# Patient Record
Sex: MALE | Race: WHITE | NOT HISPANIC OR LATINO
[De-identification: names, ages, dates, MRNs, and addresses within clinical notes are randomized per-mention and may not be internally consistent; named-entity substitution may affect disease eponyms.]

---

## 2020-07-06 ENCOUNTER — NON-APPOINTMENT (OUTPATIENT)
Age: 50
End: 2020-07-06

## 2020-07-06 ENCOUNTER — APPOINTMENT (OUTPATIENT)
Dept: INTERNAL MEDICINE | Facility: CLINIC | Age: 50
End: 2020-07-06
Payer: COMMERCIAL

## 2020-07-06 VITALS
SYSTOLIC BLOOD PRESSURE: 138 MMHG | OXYGEN SATURATION: 98 % | TEMPERATURE: 98 F | DIASTOLIC BLOOD PRESSURE: 74 MMHG | RESPIRATION RATE: 14 BRPM | WEIGHT: 315 LBS | HEIGHT: 76 IN | BODY MASS INDEX: 38.36 KG/M2 | HEART RATE: 82 BPM

## 2020-07-06 DIAGNOSIS — J30.1 ALLERGIC RHINITIS DUE TO POLLEN: ICD-10-CM

## 2020-07-06 DIAGNOSIS — Z83.49 FAMILY HISTORY OF OTHER ENDOCRINE, NUTRITIONAL AND METABOLIC DISEASES: ICD-10-CM

## 2020-07-06 DIAGNOSIS — F17.200 NICOTINE DEPENDENCE, UNSPECIFIED, UNCOMPLICATED: ICD-10-CM

## 2020-07-06 DIAGNOSIS — Z00.00 ENCOUNTER FOR GENERAL ADULT MEDICAL EXAMINATION W/OUT ABNORMAL FINDINGS: ICD-10-CM

## 2020-07-06 DIAGNOSIS — Z82.49 FAMILY HISTORY OF ISCHEMIC HEART DISEASE AND OTHER DISEASES OF THE CIRCULATORY SYSTEM: ICD-10-CM

## 2020-07-06 DIAGNOSIS — F10.21 ALCOHOL DEPENDENCE, IN REMISSION: ICD-10-CM

## 2020-07-06 PROCEDURE — 93000 ELECTROCARDIOGRAM COMPLETE: CPT

## 2020-07-06 PROCEDURE — 36415 COLL VENOUS BLD VENIPUNCTURE: CPT

## 2020-07-06 PROCEDURE — 99396 PREV VISIT EST AGE 40-64: CPT | Mod: 25

## 2020-07-06 NOTE — PLAN
[FreeTextEntry1] : See Urology to evaluate ED\par [facility] to evaluate cardiovascular disease and peripheral vascular disease\par See Pulmonary to evaluate for sleep apnea and PFTs\par Check labs

## 2020-07-06 NOTE — PHYSICAL EXAM
[Well Developed] : well developed [Well Nourished] : well nourished [No Acute Distress] : no acute distress [Normal Sclera/Conjunctiva] : normal sclera/conjunctiva [Well-Appearing] : well-appearing [Normal Outer Ear/Nose] : the outer ears and nose were normal in appearance [EOMI] : extraocular movements intact [PERRL] : pupils equal round and reactive to light [Normal Oropharynx] : the oropharynx was normal [No JVD] : no jugular venous distention [No Lymphadenopathy] : no lymphadenopathy [Supple] : supple [Thyroid Normal, No Nodules] : the thyroid was normal and there were no nodules present [No Respiratory Distress] : no respiratory distress  [No Accessory Muscle Use] : no accessory muscle use [Regular Rhythm] : with a regular rhythm [Normal Rate] : normal rate  [Clear to Auscultation] : lungs were clear to auscultation bilaterally [Normal S1, S2] : normal S1 and S2 [No Murmur] : no murmur heard [No Varicosities] : no varicosities [No Carotid Bruits] : no carotid bruits [No Abdominal Bruit] : a ~M bruit was not heard ~T in the abdomen [No Edema] : there was no peripheral edema [Pedal Pulses Present] : the pedal pulses are present [No Extremity Clubbing/Cyanosis] : no extremity clubbing/cyanosis [No Palpable Aorta] : no palpable aorta [Soft] : abdomen soft [Non Tender] : non-tender [No Masses] : no abdominal mass palpated [Non-distended] : non-distended [Normal Posterior Cervical Nodes] : no posterior cervical lymphadenopathy [Normal Bowel Sounds] : normal bowel sounds [No HSM] : no HSM [Normal Anterior Cervical Nodes] : no anterior cervical lymphadenopathy [No CVA Tenderness] : no CVA  tenderness [No Spinal Tenderness] : no spinal tenderness [Grossly Normal Strength/Tone] : grossly normal strength/tone [No Joint Swelling] : no joint swelling [Coordination Grossly Intact] : coordination grossly intact [No Rash] : no rash [No Focal Deficits] : no focal deficits [Deep Tendon Reflexes (DTR)] : deep tendon reflexes were 2+ and symmetric [Normal Gait] : normal gait [Normal Affect] : the affect was normal [Normal Insight/Judgement] : insight and judgment were intact

## 2020-07-06 NOTE — HEALTH RISK ASSESSMENT
[Fair] :  ~his/her~ mood as fair [No] : No [] : Yes [de-identified] : 3/4/ppd [de-identified] : Past history of alcoholism; sober got 20 months.

## 2020-07-06 NOTE — HISTORY OF PRESENT ILLNESS
[FreeTextEntry1] : Here for physical. [de-identified] : Here for physicql. Pt would like to quit smoking.\par Pt feels that he has poor circulation to his legs.\par He has discomfort on the bottom of both feet.\par Pt c/o Erectile dysfunction not helped by oral l medications.

## 2020-07-13 ENCOUNTER — APPOINTMENT (OUTPATIENT)
Dept: UROLOGY | Facility: CLINIC | Age: 50
End: 2020-07-13

## 2020-07-13 ENCOUNTER — APPOINTMENT (OUTPATIENT)
Dept: PULMONOLOGY | Facility: CLINIC | Age: 50
End: 2020-07-13
Payer: COMMERCIAL

## 2020-07-13 VITALS
HEIGHT: 76 IN | OXYGEN SATURATION: 96 % | HEART RATE: 74 BPM | WEIGHT: 315 LBS | DIASTOLIC BLOOD PRESSURE: 92 MMHG | BODY MASS INDEX: 38.36 KG/M2 | SYSTOLIC BLOOD PRESSURE: 143 MMHG

## 2020-07-13 LAB
ALBUMIN SERPL ELPH-MCNC: 4.6 G/DL
ALP BLD-CCNC: 114 U/L
ALT SERPL-CCNC: 14 U/L
ANION GAP SERPL CALC-SCNC: 14 MMOL/L
APPEARANCE: CLEAR
AST SERPL-CCNC: 18 U/L
BASOPHILS # BLD AUTO: 0.08 K/UL
BASOPHILS NFR BLD AUTO: 1.3 %
BILIRUB SERPL-MCNC: 0.4 MG/DL
BILIRUBIN URINE: NEGATIVE
BLOOD URINE: NEGATIVE
BUN SERPL-MCNC: 7 MG/DL
CALCIUM SERPL-MCNC: 9 MG/DL
CHLORIDE SERPL-SCNC: 105 MMOL/L
CHOLEST SERPL-MCNC: 130 MG/DL
CHOLEST/HDLC SERPL: 4.1 RATIO
CO2 SERPL-SCNC: 20 MMOL/L
COLOR: NORMAL
CREAT SERPL-MCNC: 0.77 MG/DL
EOSINOPHIL # BLD AUTO: 0.2 K/UL
EOSINOPHIL NFR BLD AUTO: 3.3 %
ESTIMATED AVERAGE GLUCOSE: 114 MG/DL
FOLATE SERPL-MCNC: 13 NG/ML
GLUCOSE QUALITATIVE U: NEGATIVE
GLUCOSE SERPL-MCNC: 89 MG/DL
HBA1C MFR BLD HPLC: 5.6 %
HCT VFR BLD CALC: 37.6 %
HDLC SERPL-MCNC: 32 MG/DL
HGB BLD-MCNC: 11.2 G/DL
IMM GRANULOCYTES NFR BLD AUTO: 0.3 %
KETONES URINE: NEGATIVE
LDLC SERPL CALC-MCNC: 76 MG/DL
LEUKOCYTE ESTERASE URINE: NEGATIVE
LYMPHOCYTES # BLD AUTO: 2.75 K/UL
LYMPHOCYTES NFR BLD AUTO: 45.1 %
MAN DIFF?: NORMAL
MCHC RBC-ENTMCNC: 26.1 PG
MCHC RBC-ENTMCNC: 29.8 GM/DL
MCV RBC AUTO: 87.6 FL
MONOCYTES # BLD AUTO: 0.57 K/UL
MONOCYTES NFR BLD AUTO: 9.3 %
NEUTROPHILS # BLD AUTO: 2.48 K/UL
NEUTROPHILS NFR BLD AUTO: 40.7 %
NITRITE URINE: NEGATIVE
PH URINE: 6
PLATELET # BLD AUTO: 280 K/UL
POTASSIUM SERPL-SCNC: 4.5 MMOL/L
PROT SERPL-MCNC: 6.8 G/DL
PROTEIN URINE: NEGATIVE
PSA SERPL-MCNC: 0.29 NG/ML
RBC # BLD: 4.29 M/UL
RBC # FLD: 15 %
SARS-COV-2 IGG SERPL IA-ACNC: <0.1 INDEX
SARS-COV-2 IGG SERPL QL IA: NEGATIVE
SHBG SERPL-SCNC: 37 NMOL/L
SODIUM SERPL-SCNC: 138 MMOL/L
SPECIFIC GRAVITY URINE: 1.01
TESTOST BND SERPL-MCNC: 6 PG/ML
TESTOST SERPL-MCNC: 275.2 NG/DL
TRIGL SERPL-MCNC: 110 MG/DL
TSH SERPL-ACNC: 1.59 UIU/ML
UROBILINOGEN URINE: NORMAL
VIT B12 SERPL-MCNC: <150 PG/ML
WBC # FLD AUTO: 6.1 K/UL

## 2020-07-13 PROCEDURE — 99244 OFF/OP CNSLTJ NEW/EST MOD 40: CPT

## 2020-07-13 NOTE — REVIEW OF SYSTEMS
[Postnasal Drip] : postnasal drip [Cough] : cough [Sputum] : sputum [SOB on Exertion] : sob on exertion [Hay Fever] : hay fever [Negative] : Gastrointestinal

## 2020-07-13 NOTE — PHYSICAL EXAM
[No Acute Distress] : no acute distress [Normal Appearance] : normal appearance [Normal S1, S2] : normal s1, s2 [No Resp Distress] : no resp distress [Clear to Auscultation Bilaterally] : clear to auscultation bilaterally [No Clubbing] : no clubbing [No Cyanosis] : no cyanosis [II] : Mallampati Class: II

## 2020-07-13 NOTE — HISTORY OF PRESENT ILLNESS
[Current] : current [TextBox_4] : 49M active smoker, obese.\par \par Trying to quit smoking, active but gets winded walking uphill.   Has a "smokers cough" which is productive.  No hemoptysis.  Used to get chest infections in college but nothing recent.  Never hospitalized for breathing troubles.  No inhaler use.  \par \par during sleep has "labored breathing" , no witnessed apneas,  is restless at night.  No waking up gasping/choking.  Sleeps about 7hrs/night.  Prior to pandemic was difficult to wake up in am, now feels a little more well rested bc sleeping longer.  Reports occasional headaches.  Sometimes has a "head fog", unsure if related to allergies. 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  [TextBox_11] : 0.5 [TextBox_13] : 30
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
## 2020-07-17 ENCOUNTER — OUTPATIENT (OUTPATIENT)
Dept: OUTPATIENT SERVICES | Facility: HOSPITAL | Age: 50
LOS: 1 days | End: 2020-07-17
Payer: COMMERCIAL

## 2020-07-17 ENCOUNTER — APPOINTMENT (OUTPATIENT)
Dept: DISASTER EMERGENCY | Facility: CLINIC | Age: 50
End: 2020-07-17

## 2020-07-17 ENCOUNTER — APPOINTMENT (OUTPATIENT)
Dept: CT IMAGING | Facility: CLINIC | Age: 50
End: 2020-07-17
Payer: COMMERCIAL

## 2020-07-17 ENCOUNTER — RESULT REVIEW (OUTPATIENT)
Age: 50
End: 2020-07-17

## 2020-07-17 DIAGNOSIS — Z01.818 ENCOUNTER FOR OTHER PREPROCEDURAL EXAMINATION: ICD-10-CM

## 2020-07-17 DIAGNOSIS — Z00.8 ENCOUNTER FOR OTHER GENERAL EXAMINATION: ICD-10-CM

## 2020-07-17 PROCEDURE — 71250 CT THORAX DX C-: CPT

## 2020-07-17 PROCEDURE — 71250 CT THORAX DX C-: CPT | Mod: 26

## 2020-07-18 LAB — SARS-COV-2 N GENE NPH QL NAA+PROBE: NOT DETECTED

## 2020-07-20 ENCOUNTER — APPOINTMENT (OUTPATIENT)
Dept: INTERNAL MEDICINE | Facility: CLINIC | Age: 50
End: 2020-07-20
Payer: COMMERCIAL

## 2020-07-20 ENCOUNTER — APPOINTMENT (OUTPATIENT)
Dept: PULMONOLOGY | Facility: CLINIC | Age: 50
End: 2020-07-20
Payer: COMMERCIAL

## 2020-07-20 VITALS — OXYGEN SATURATION: 97 % | HEART RATE: 82 BPM | SYSTOLIC BLOOD PRESSURE: 130 MMHG | DIASTOLIC BLOOD PRESSURE: 85 MMHG

## 2020-07-20 VITALS — HEIGHT: 75.5 IN | BODY MASS INDEX: 38.76 KG/M2 | WEIGHT: 315 LBS

## 2020-07-20 DIAGNOSIS — R91.8 OTHER NONSPECIFIC ABNORMAL FINDING OF LUNG FIELD: ICD-10-CM

## 2020-07-20 DIAGNOSIS — R06.83 SNORING: ICD-10-CM

## 2020-07-20 DIAGNOSIS — F17.200 NICOTINE DEPENDENCE, UNSPECIFIED, UNCOMPLICATED: ICD-10-CM

## 2020-07-20 PROCEDURE — 94060 EVALUATION OF WHEEZING: CPT

## 2020-07-20 PROCEDURE — 94726 PLETHYSMOGRAPHY LUNG VOLUMES: CPT

## 2020-07-20 PROCEDURE — 99214 OFFICE O/P EST MOD 30 MIN: CPT | Mod: 25

## 2020-07-20 PROCEDURE — 94750: CPT

## 2020-07-20 PROCEDURE — 96372 THER/PROPH/DIAG INJ SC/IM: CPT

## 2020-07-20 PROCEDURE — 95800 SLP STDY UNATTENDED: CPT

## 2020-07-20 PROCEDURE — 94664 DEMO&/EVAL PT USE INHALER: CPT | Mod: NC

## 2020-07-20 PROCEDURE — 94729 DIFFUSING CAPACITY: CPT

## 2020-07-20 PROCEDURE — 99406 BEHAV CHNG SMOKING 3-10 MIN: CPT

## 2020-07-20 PROCEDURE — ZZZZZ: CPT

## 2020-07-20 RX ORDER — CYANOCOBALAMIN 1000 UG/ML
1000 INJECTION INTRAMUSCULAR; SUBCUTANEOUS
Qty: 0 | Refills: 0 | Status: COMPLETED | OUTPATIENT
Start: 2020-07-20

## 2020-07-20 RX ADMIN — CYANOCOBALAMIN 0 MCG/ML: 1000 INJECTION, SOLUTION INTRAMUSCULAR at 00:00

## 2020-07-20 NOTE — REASON FOR VISIT
[Follow-Up] : a follow-up visit [COPD] : COPD [Abnormal CXR/ Chest CT] : an abnormal CXR/ chest CT [Sleep Apnea] : sleep apnea

## 2020-07-20 NOTE — PROCEDURE
[FreeTextEntry1] : PFT: mild obstructive dysfunction without a significant bronchodilator response.  Lung volumes normal. DLCO normal.\par \par \par \par \par Reviewed:\par EXAM: CT CHEST \par \par \par PROCEDURE DATE: 07/17/2020 \par \par \par \par INTERPRETATION: CLINICAL INFORMATION: Lung nodule, smoker \par \par COMPARISON: CT chest 6/19/2015 \par \par PROCEDURE: \par CT of the Chest was performed without intravenous contrast. \par Sagittal and coronal reformats were performed. \par \par \par FINDINGS: \par \par AIRWAYS, LUNGS and PLEURA: Patent central airways. Mild bronchial wall \par thickening. No bronchiectasis. Few smaller than 3 mm nodules are stable from \par 2015 and definitely benign in light of greater than 2 years of stability. \par The lungs are otherwise clear. No new or enlarging nodule. \par \par No pleural effusion. \par \par MEDIASTINUM AND TAMARA: No lymphadenopathy. \par \par HEART AND VESSELS: Heart size is normal. No pericardial effusion. Thoracic \par aorta and pulmonary artery are normal in diameter. \par \par VISUALIZED UPPER ABDOMEN: Status post gastric bypass surgery. \par \par CHEST WALL AND BONES: No aggressive osseous lesion. \par \par LOWER NECK: Within normal limits. \par \par IMPRESSION: \par \par No suspicious lung nodule.

## 2020-07-20 NOTE — HISTORY OF PRESENT ILLNESS
[Current] : current [TextBox_4] : had PFT here for results\par \par had ct chest here for results\par \par no changes.\par \par picked up HSS\par \par understands needs to quit smoking

## 2020-07-20 NOTE — PHYSICAL EXAM
[No Acute Distress] : no acute distress [Normal Appearance] : normal appearance [Normal S1, S2] : normal s1, s2 [No Resp Distress] : no resp distress [Clear to Auscultation Bilaterally] : clear to auscultation bilaterally [No Cyanosis] : no cyanosis [No Clubbing] : no clubbing

## 2020-07-21 PROCEDURE — 95800 SLP STDY UNATTENDED: CPT

## 2020-07-22 ENCOUNTER — APPOINTMENT (OUTPATIENT)
Dept: CARDIOLOGY | Facility: CLINIC | Age: 50
End: 2020-07-22
Payer: COMMERCIAL

## 2020-07-22 VITALS
WEIGHT: 315 LBS | HEIGHT: 75.5 IN | DIASTOLIC BLOOD PRESSURE: 85 MMHG | BODY MASS INDEX: 38.76 KG/M2 | OXYGEN SATURATION: 97 % | HEART RATE: 70 BPM | SYSTOLIC BLOOD PRESSURE: 132 MMHG

## 2020-07-22 DIAGNOSIS — M79.89 OTHER SPECIFIED SOFT TISSUE DISORDERS: ICD-10-CM

## 2020-07-22 PROCEDURE — 99204 OFFICE O/P NEW MOD 45 MIN: CPT

## 2020-08-04 ENCOUNTER — APPOINTMENT (OUTPATIENT)
Dept: INTERNAL MEDICINE | Facility: CLINIC | Age: 50
End: 2020-08-04
Payer: COMMERCIAL

## 2020-08-04 PROCEDURE — 96372 THER/PROPH/DIAG INJ SC/IM: CPT

## 2020-08-04 RX ORDER — CYANOCOBALAMIN 1000 UG/ML
1000 INJECTION INTRAMUSCULAR; SUBCUTANEOUS
Qty: 0 | Refills: 0 | Status: COMPLETED | OUTPATIENT
Start: 2020-08-04

## 2020-08-04 RX ADMIN — CYANOCOBALAMIN 0 MCG/ML: 1000 INJECTION, SOLUTION INTRAMUSCULAR at 00:00

## 2020-08-11 ENCOUNTER — APPOINTMENT (OUTPATIENT)
Dept: INTERNAL MEDICINE | Facility: CLINIC | Age: 50
End: 2020-08-11

## 2020-08-18 ENCOUNTER — APPOINTMENT (OUTPATIENT)
Dept: CARDIOLOGY | Facility: CLINIC | Age: 50
End: 2020-08-18
Payer: COMMERCIAL

## 2020-08-18 PROCEDURE — 93306 TTE W/DOPPLER COMPLETE: CPT

## 2020-08-31 ENCOUNTER — APPOINTMENT (OUTPATIENT)
Dept: UROLOGY | Facility: CLINIC | Age: 50
End: 2020-08-31
Payer: COMMERCIAL

## 2020-08-31 VITALS
OXYGEN SATURATION: 97 % | BODY MASS INDEX: 38.76 KG/M2 | HEART RATE: 81 BPM | WEIGHT: 315 LBS | DIASTOLIC BLOOD PRESSURE: 79 MMHG | HEIGHT: 75.5 IN | SYSTOLIC BLOOD PRESSURE: 121 MMHG

## 2020-08-31 DIAGNOSIS — I87.2 VENOUS INSUFFICIENCY (CHRONIC) (PERIPHERAL): ICD-10-CM

## 2020-08-31 LAB
ESTRADIOL SERPL-MCNC: 18 PG/ML
FSH SERPL-MCNC: 7.8 IU/L
LH SERPL-ACNC: 4.7 IU/L
PROLACTIN SERPL-MCNC: 7 NG/ML

## 2020-08-31 PROCEDURE — 99204 OFFICE O/P NEW MOD 45 MIN: CPT

## 2020-08-31 NOTE — REVIEW OF SYSTEMS
[both] : pain during and after intercourse [denies] : denies pain with orgasm [Poor quality erections] : Poor quality erections [No erections] : no erections [base] : pain in base of penis [Wake up at night to urinate  How many times?  ___] : wakes up to urinate [unfilled] times during the night [Negative] : Heme/Lymph

## 2020-09-01 ENCOUNTER — APPOINTMENT (OUTPATIENT)
Dept: CARDIOLOGY | Facility: CLINIC | Age: 50
End: 2020-09-01
Payer: COMMERCIAL

## 2020-09-01 PROCEDURE — 93015 CV STRESS TEST SUPVJ I&R: CPT

## 2020-09-13 NOTE — PHYSICAL EXAM
[General Appearance - Well Developed] : well developed [Normal Appearance] : normal appearance [General Appearance - In No Acute Distress] : no acute distress [] : no respiratory distress [Abdomen Soft] : soft [Abdomen Tenderness] : non-tender [Abdomen Mass (___ Cm)] : no abdominal mass palpated [Costovertebral Angle Tenderness] : no ~M costovertebral angle tenderness [Urethral Meatus] : meatus normal [Penis Abnormality] : normal circumcised penis [Scrotum] : the scrotum was normal [Epididymis] : the epididymides were normal [Testes Tenderness] : no tenderness of the testes [Testes Mass (___cm)] : there were no testicular masses [Prostate Tenderness] : the prostate was not tender [No Prostate Nodules] : no prostate nodules [Prostate Size ___ gm] : prostate size [unfilled] gm [FreeTextEntry1] : bilateral lower extremity varicose veins  [Skin Color & Pigmentation] : normal skin color and pigmentation [No Focal Deficits] : no focal deficits [Oriented To Time, Place, And Person] : oriented to person, place, and time [No Palpable Adenopathy] : no palpable adenopathy

## 2020-09-13 NOTE — ASSESSMENT
[FreeTextEntry1] : Erectile dysfunction:\par Reviewed pathophysiology and differential diagnosis of erectile dysfunction with the patient. Discussed lifestyle changes. \par The patient was made aware that the current therapies for erectile dysfunction consisting of oral phosphodiesterase type 5 inhibitors, intra-urethral alprostadil, intracavernous vasoactive drug injection, vacuum constriction devices and penile prosthesis implantation. Relative risks and benefits, were discussed. All questions were answered.\par Wants to try Cialis/Tadalafil. Discussed proper use. \par Recommended that patient start with 5 mg and try a few times and increase up to maximum of 20 mg to be taken as needed not on 2 consecutive days. \par The patient understands that these medications are not initiators of erection and that he will still require sexual stimulation. \par He was advised to take the medication 30-60 minutes prior to sexual activity and that the efficacy of the medication is decreased following a high-fat meal or concurrent use of alcohol. \par Explained the common adverse effects of therapy including, but not limited to headache, flushing, upset stomach, nasal congestion, abnormal vision, back pain, and myalgia. Asked pt to stop taking the medicine if he develops chest pain, visual or auditory disturbance. \par Explained priapism- if erection does not go down after ejaculation or lasts more than 4 hrs to present to emergency room. \par \par Hypogonadism:\par Discussed low normal Testosterone levels. Discussed trial of Testosterone replacement therapy- gels, patches, injections and pellets. \par Discussed risks and benefits. Pt wants to proceed. Mentioned will get baseline labs and if normal will initiate TRT. \par Will get Total, Free and Bio available Testosterone with Estradiol, LH, FSH and PRL. \par \par Venous insufficiency:\par Will get bilateral lower extremity Doppler.\par \par Return to office after Ultrasound and Labs. \par

## 2020-09-13 NOTE — HISTORY OF PRESENT ILLNESS
[FreeTextEntry1] : 50 yo male presents for Erectile dysfunction.\par Has problem with attaining and maintaining erections. Rates erections as 1/5. \par Reports decreased libido and fatigue. Has tried Phosphodiesterase 5 inhibitors in the past: Sildenafil and Tadalafil- no response. \par Reports reasonable stream, urinates every few hours or so during the day. Nocturia of 1-2 x. \par Denies hesitancy, straining, intermittency, urgency, incontinence, sense of incomplete emptying.\par Denies dysuria, hematuria, lower abdominal or flank pain, fever, chills or rigors.\par No family history of Prostate cancer. \par Smoker: 0.5 PPD \par

## 2020-09-14 LAB
TESTOST BND SERPL-MCNC: 5.59 NG/DL
TESTOSTERONE BIOAVAILABLE: 59 NG/DL
TESTOSTERONE TOTAL S: 294 NG/DL

## 2020-09-26 NOTE — CONSULT LETTER
[FreeTextEntry1] : Dear ,\par \par I had the pleasure of evaluating your patient, AMARA PERKINS today in pulmonary consultation.  Please refer to my attached note for my findings and recommendations.\par \par \par Thank you for allowing me to participate in the care of your patient, please feel free to call with any questions or concerns.\par \par \par Sincerely,\par \par Pearl Sebastian MD\par [facility] \par [facility]\par \par  normal...

## 2020-11-05 ENCOUNTER — APPOINTMENT (OUTPATIENT)
Dept: HEART AND VASCULAR | Facility: CLINIC | Age: 50
End: 2020-11-05

## 2021-01-25 ENCOUNTER — APPOINTMENT (OUTPATIENT)
Dept: CARDIOLOGY | Facility: CLINIC | Age: 51
End: 2021-01-25

## 2021-02-04 ENCOUNTER — APPOINTMENT (OUTPATIENT)
Dept: UROLOGY | Facility: CLINIC | Age: 51
End: 2021-02-04
Payer: COMMERCIAL

## 2021-02-04 VITALS
OXYGEN SATURATION: 99 % | SYSTOLIC BLOOD PRESSURE: 147 MMHG | HEIGHT: 75.5 IN | WEIGHT: 315 LBS | HEART RATE: 81 BPM | BODY MASS INDEX: 38.76 KG/M2 | DIASTOLIC BLOOD PRESSURE: 96 MMHG

## 2021-02-04 DIAGNOSIS — N52.9 MALE ERECTILE DYSFUNCTION, UNSPECIFIED: ICD-10-CM

## 2021-02-04 DIAGNOSIS — E29.1 TESTICULAR HYPOFUNCTION: ICD-10-CM

## 2021-02-04 DIAGNOSIS — N48.9 DISORDER OF PENIS, UNSPECIFIED: ICD-10-CM

## 2021-02-04 PROCEDURE — 99072 ADDL SUPL MATRL&STAF TM PHE: CPT

## 2021-02-04 PROCEDURE — 99214 OFFICE O/P EST MOD 30 MIN: CPT

## 2021-02-10 NOTE — ASSESSMENT
[FreeTextEntry1] : Hypogonadism:\par Discussed risks and benefits of Testosterone replacement therapy, \par Patient wants to proceed: Testosterone cypionate injections in the office. \par Will get baseline labs and after review will initiate testosterone replacement therapy.\par \par Penile lesion:\par Discussed on physical exam penile plaque possibly secondary to Peyronie's disease. \par Asked to monitor for penile pain and angulation on erection. \par \par Erectile dysfunction:\par Wants to try Cialis/Tadalafil. Discussed proper use. \par Recommended that patient start with 5 mg and take it daily. \par If partial response can increase to 10 mg and up to 20 mg, to be taken as needed and not on 2 consecutive days.\par The patient understands that these medications are not initiators of erection and that he will still require sexual stimulation. \par He was advised to take the medication 30-60 minutes prior to sexual activity and that the efficacy of the medication is decreased following a high-fat meal or concurrent use of alcohol. \par Explained the common adverse effects of therapy including, but not limited to headache, flushing, upset stomach, nasal congestion, abnormal vision, back pain, and myalgia. Asked pt to stop taking the medicine if he develops chest pain, visual or auditory disturbance. \par Explained priapism- if erection does not go down after ejaculation or lasts more than 4 hrs to present to emergency room. \par \par Return to office in  or sooner if any issues: 2/12/21. \par

## 2021-02-10 NOTE — PHYSICAL EXAM
[Normal Appearance] : normal appearance [General Appearance - In No Acute Distress] : no acute distress [Abdomen Soft] : soft [Abdomen Tenderness] : non-tender [Costovertebral Angle Tenderness] : no ~M costovertebral angle tenderness [Skin Color & Pigmentation] : normal skin color and pigmentation [] : no respiratory distress [Oriented To Time, Place, And Person] : oriented to person, place, and time [FreeTextEntry1] : bilateral lower extremity varicose veins  [No Focal Deficits] : no focal deficits

## 2021-02-10 NOTE — HISTORY OF PRESENT ILLNESS
[FreeTextEntry1] : 51 yo male presents for follow up. \par Did not take Tadalafil. \par Same complaints. Also complaining of penile lump. \par \par Initially seen for Erectile dysfunction.\par Had problem with attaining and maintaining erections. Rated erections as 1/5. \par Reported decreased libido and fatigue. Has tried Phosphodiesterase 5 inhibitors in the past: Sildenafil and Tadalafil- no response. \par Reported reasonable stream, urinates every few hours or so during the day. Nocturia of 1-2 x. \par Denied hesitancy, straining, intermittency, urgency, incontinence, sense of incomplete emptying.\par Denied dysuria, hematuria, lower abdominal or flank pain, fever, chills or rigors.\par No family history of Prostate cancer. \par Smoker: 0.5 PPD \par

## 2021-08-04 ENCOUNTER — APPOINTMENT (OUTPATIENT)
Dept: INTERNAL MEDICINE | Facility: CLINIC | Age: 51
End: 2021-08-04
Payer: COMMERCIAL

## 2021-08-04 DIAGNOSIS — Z23 ENCOUNTER FOR IMMUNIZATION: ICD-10-CM

## 2021-08-04 PROCEDURE — 90471 IMMUNIZATION ADMIN: CPT

## 2021-08-04 PROCEDURE — 90750 HZV VACC RECOMBINANT IM: CPT

## 2021-08-16 ENCOUNTER — APPOINTMENT (OUTPATIENT)
Dept: INTERNAL MEDICINE | Facility: CLINIC | Age: 51
End: 2021-08-16

## 2021-10-06 ENCOUNTER — APPOINTMENT (OUTPATIENT)
Dept: INTERNAL MEDICINE | Facility: CLINIC | Age: 51
End: 2021-10-06

## 2024-01-30 ENCOUNTER — APPOINTMENT (OUTPATIENT)
Dept: INTERNAL MEDICINE | Facility: CLINIC | Age: 54
End: 2024-01-30
Payer: COMMERCIAL

## 2024-01-30 ENCOUNTER — LABORATORY RESULT (OUTPATIENT)
Age: 54
End: 2024-01-30

## 2024-01-30 ENCOUNTER — NON-APPOINTMENT (OUTPATIENT)
Age: 54
End: 2024-01-30

## 2024-01-30 ENCOUNTER — TRANSCRIPTION ENCOUNTER (OUTPATIENT)
Age: 54
End: 2024-01-30

## 2024-01-30 VITALS
SYSTOLIC BLOOD PRESSURE: 136 MMHG | RESPIRATION RATE: 16 BRPM | BODY MASS INDEX: 38.76 KG/M2 | HEIGHT: 75.5 IN | OXYGEN SATURATION: 98 % | WEIGHT: 315 LBS | HEART RATE: 128 BPM | DIASTOLIC BLOOD PRESSURE: 76 MMHG

## 2024-01-30 DIAGNOSIS — R26.89 OTHER ABNORMALITIES OF GAIT AND MOBILITY: ICD-10-CM

## 2024-01-30 DIAGNOSIS — R35.0 FREQUENCY OF MICTURITION: ICD-10-CM

## 2024-01-30 DIAGNOSIS — R53.1 WEAKNESS: ICD-10-CM

## 2024-01-30 DIAGNOSIS — R42 DIZZINESS AND GIDDINESS: ICD-10-CM

## 2024-01-30 DIAGNOSIS — R63.1 POLYDIPSIA: ICD-10-CM

## 2024-01-30 PROCEDURE — 81003 URINALYSIS AUTO W/O SCOPE: CPT | Mod: QW

## 2024-01-30 PROCEDURE — 36415 COLL VENOUS BLD VENIPUNCTURE: CPT

## 2024-01-30 PROCEDURE — 93000 ELECTROCARDIOGRAM COMPLETE: CPT

## 2024-01-30 PROCEDURE — 82962 GLUCOSE BLOOD TEST: CPT

## 2024-01-30 PROCEDURE — G2211 COMPLEX E/M VISIT ADD ON: CPT | Mod: NC,1L

## 2024-01-30 PROCEDURE — 99214 OFFICE O/P EST MOD 30 MIN: CPT | Mod: 25

## 2024-02-04 LAB
BILIRUB UR QL STRIP: NORMAL
CLARITY UR: CLEAR
COLLECTION METHOD: NORMAL
GLUCOSE BLDC GLUCOMTR-MCNC: 131
GLUCOSE UR-MCNC: NORMAL
HCG UR QL: 2 EU/DL
HGB UR QL STRIP.AUTO: NORMAL
KETONES UR-MCNC: NORMAL
LEUKOCYTE ESTERASE UR QL STRIP: NORMAL
NITRITE UR QL STRIP: NORMAL
PH UR STRIP: 6
PROT UR STRIP-MCNC: NORMAL
SP GR UR STRIP: 1.02

## 2024-02-04 NOTE — PLAN
Patient lethargic, A&Ox3. BP 98/57 No acute distress noted. [FreeTextEntry1] : Check labs Further management pending results See Neurology See Cardiology

## 2024-02-06 ENCOUNTER — APPOINTMENT (OUTPATIENT)
Dept: MRI IMAGING | Facility: CLINIC | Age: 54
End: 2024-02-06
Payer: COMMERCIAL

## 2024-02-06 ENCOUNTER — NON-APPOINTMENT (OUTPATIENT)
Age: 54
End: 2024-02-06

## 2024-02-06 ENCOUNTER — OUTPATIENT (OUTPATIENT)
Dept: OUTPATIENT SERVICES | Facility: HOSPITAL | Age: 54
LOS: 1 days | End: 2024-02-06
Payer: COMMERCIAL

## 2024-02-06 DIAGNOSIS — R42 DIZZINESS AND GIDDINESS: ICD-10-CM

## 2024-02-06 DIAGNOSIS — Z00.8 ENCOUNTER FOR OTHER GENERAL EXAMINATION: ICD-10-CM

## 2024-02-06 DIAGNOSIS — R26.89 OTHER ABNORMALITIES OF GAIT AND MOBILITY: ICD-10-CM

## 2024-02-06 LAB
ALBUMIN SERPL ELPH-MCNC: 4.5 G/DL
ALP BLD-CCNC: 130 U/L
ALT SERPL-CCNC: 12 U/L
ANION GAP SERPL CALC-SCNC: 11 MMOL/L
APPEARANCE: CLEAR
AST SERPL-CCNC: 15 U/L
BILIRUB SERPL-MCNC: 0.4 MG/DL
BILIRUBIN URINE: NEGATIVE
BLOOD URINE: NEGATIVE
BUN SERPL-MCNC: 10 MG/DL
CALCIUM SERPL-MCNC: 8.5 MG/DL
CHLORIDE SERPL-SCNC: 104 MMOL/L
CHOLEST SERPL-MCNC: 129 MG/DL
CO2 SERPL-SCNC: 22 MMOL/L
COLOR: YELLOW
CREAT SERPL-MCNC: 0.68 MG/DL
CRP SERPL-MCNC: <3 MG/L
EGFR: 110 ML/MIN/1.73M2
ESTIMATED AVERAGE GLUCOSE: 111 MG/DL
FERRITIN SERPL-MCNC: 9 NG/ML
FOLATE SERPL-MCNC: 13.4 NG/ML
GLUCOSE QUALITATIVE U: NEGATIVE MG/DL
GLUCOSE SERPL-MCNC: 87 MG/DL
HBA1C MFR BLD HPLC: 5.5 %
HCT VFR BLD CALC: 33 %
HDLC SERPL-MCNC: 26 MG/DL
HGB BLD-MCNC: 9.9 G/DL
IRON SATN MFR SERPL: 6 %
IRON SERPL-MCNC: 27 UG/DL
KETONES URINE: NEGATIVE MG/DL
LDLC SERPL CALC-MCNC: 81 MG/DL
LEUKOCYTE ESTERASE URINE: NEGATIVE
MCHC RBC-ENTMCNC: 22.4 PG
MCHC RBC-ENTMCNC: 30 GM/DL
MCV RBC AUTO: 74.8 FL
NITRITE URINE: NEGATIVE
NONHDLC SERPL-MCNC: 102 MG/DL
PH URINE: 6
PLATELET # BLD AUTO: 284 K/UL
POTASSIUM SERPL-SCNC: 4.3 MMOL/L
PROT SERPL-MCNC: 6.8 G/DL
PROTEIN URINE: NEGATIVE MG/DL
PSA SERPL-MCNC: 0.23 NG/ML
RBC # BLD: 4.41 M/UL
RBC # FLD: 16.5 %
SODIUM SERPL-SCNC: 138 MMOL/L
SPECIFIC GRAVITY URINE: 1.01
TESTOST FREE SERPL-MCNC: 1.9 PG/ML
TESTOST SERPL-MCNC: 173 NG/DL
TIBC SERPL-MCNC: 482 UG/DL
TRIGL SERPL-MCNC: 117 MG/DL
TSH SERPL-ACNC: 1.5 UIU/ML
UIBC SERPL-MCNC: 456 UG/DL
UROBILINOGEN URINE: 1 MG/DL
VIT B12 SERPL-MCNC: 153 PG/ML
WBC # FLD AUTO: 6.1 K/UL

## 2024-02-06 PROCEDURE — 70551 MRI BRAIN STEM W/O DYE: CPT

## 2024-02-06 PROCEDURE — 70551 MRI BRAIN STEM W/O DYE: CPT | Mod: 26

## 2024-02-20 ENCOUNTER — APPOINTMENT (OUTPATIENT)
Dept: INTERNAL MEDICINE | Facility: CLINIC | Age: 54
End: 2024-02-20
Payer: COMMERCIAL

## 2024-02-20 PROCEDURE — 96372 THER/PROPH/DIAG INJ SC/IM: CPT

## 2024-02-20 RX ADMIN — CYANOCOBALAMIN 0 MCG/ML: 1000 INJECTION INTRAMUSCULAR; SUBCUTANEOUS at 00:00

## 2024-02-21 ENCOUNTER — APPOINTMENT (OUTPATIENT)
Dept: GASTROENTEROLOGY | Facility: CLINIC | Age: 54
End: 2024-02-21
Payer: COMMERCIAL

## 2024-02-21 VITALS
TEMPERATURE: 98.4 F | OXYGEN SATURATION: 98 % | SYSTOLIC BLOOD PRESSURE: 138 MMHG | HEIGHT: 76 IN | HEART RATE: 84 BPM | BODY MASS INDEX: 38.36 KG/M2 | WEIGHT: 315 LBS | RESPIRATION RATE: 12 BRPM | DIASTOLIC BLOOD PRESSURE: 80 MMHG

## 2024-02-21 DIAGNOSIS — E66.01 MORBID (SEVERE) OBESITY DUE TO EXCESS CALORIES: ICD-10-CM

## 2024-02-21 DIAGNOSIS — J44.9 CHRONIC OBSTRUCTIVE PULMONARY DISEASE, UNSPECIFIED: ICD-10-CM

## 2024-02-21 DIAGNOSIS — D50.9 IRON DEFICIENCY ANEMIA, UNSPECIFIED: ICD-10-CM

## 2024-02-21 PROCEDURE — 99204 OFFICE O/P NEW MOD 45 MIN: CPT

## 2024-02-21 RX ORDER — CYANOCOBALAMIN 1000 UG/ML
1000 INJECTION INTRAMUSCULAR; SUBCUTANEOUS
Qty: 1 | Refills: 0 | Status: COMPLETED | OUTPATIENT
Start: 2024-02-20

## 2024-02-29 ENCOUNTER — APPOINTMENT (OUTPATIENT)
Dept: INTERNAL MEDICINE | Facility: CLINIC | Age: 54
End: 2024-02-29
Payer: COMMERCIAL

## 2024-02-29 VITALS
DIASTOLIC BLOOD PRESSURE: 86 MMHG | WEIGHT: 315 LBS | TEMPERATURE: 98 F | HEART RATE: 82 BPM | SYSTOLIC BLOOD PRESSURE: 134 MMHG | RESPIRATION RATE: 16 BRPM | BODY MASS INDEX: 38.36 KG/M2 | OXYGEN SATURATION: 98 % | HEIGHT: 76 IN

## 2024-02-29 DIAGNOSIS — E53.8 DEFICIENCY OF OTHER SPECIFIED B GROUP VITAMINS: ICD-10-CM

## 2024-02-29 PROCEDURE — 99214 OFFICE O/P EST MOD 30 MIN: CPT | Mod: 25

## 2024-02-29 PROCEDURE — 96372 THER/PROPH/DIAG INJ SC/IM: CPT

## 2024-02-29 PROCEDURE — 87426 SARSCOV CORONAVIRUS AG IA: CPT | Mod: QW

## 2024-02-29 RX ORDER — CYANOCOBALAMIN 1000 UG/ML
1000 INJECTION INTRAMUSCULAR; SUBCUTANEOUS
Qty: 0 | Refills: 0 | Status: COMPLETED | OUTPATIENT
Start: 2024-02-29

## 2024-02-29 RX ADMIN — CYANOCOBALAMIN 0 MCG/ML: 1000 INJECTION INTRAMUSCULAR; SUBCUTANEOUS at 00:00

## 2024-03-01 PROBLEM — E53.8 VITAMIN B12 DEFICIENCY: Status: ACTIVE | Noted: 2020-07-20

## 2024-03-01 NOTE — HISTORY OF PRESENT ILLNESS
[FreeTextEntry1] : Here for follow-up. Seeing Dr. Lindsay , Neurology, regarding evidence of stroke on MRI Brain. Has a follow up appointment next week. Saw Xander Barrera, GI. Pt will have EGD and colonoscopy. Pt requests  second B12 injection today. [de-identified] : Today, pt reports that he feels less tired and  feels that his coordination is improved

## 2024-03-01 NOTE — PLAN
[FreeTextEntry1] : Follow up with Neurology See Cardiology Trial of nicotine patches to quit smoking Referred to [facility]

## 2024-03-01 NOTE — PHYSICAL EXAM
[No Acute Distress] : no acute distress [Well Nourished] : well nourished [Well Developed] : well developed [Well-Appearing] : well-appearing [Normal Sclera/Conjunctiva] : normal sclera/conjunctiva [PERRL] : pupils equal round and reactive to light [EOMI] : extraocular movements intact [Normal Outer Ear/Nose] : the outer ears and nose were normal in appearance [Normal Oropharynx] : the oropharynx was normal [No JVD] : no jugular venous distention [No Lymphadenopathy] : no lymphadenopathy [Supple] : supple [Thyroid Normal, No Nodules] : the thyroid was normal and there were no nodules present [No Respiratory Distress] : no respiratory distress  [No Accessory Muscle Use] : no accessory muscle use [Clear to Auscultation] : lungs were clear to auscultation bilaterally [Normal Rate] : normal rate  [Regular Rhythm] : with a regular rhythm [Normal S1, S2] : normal S1 and S2 [No Murmur] : no murmur heard [No Carotid Bruits] : no carotid bruits [No Abdominal Bruit] : a ~M bruit was not heard ~T in the abdomen [Pedal Pulses Present] : the pedal pulses are present [No Varicosities] : no varicosities [No Palpable Aorta] : no palpable aorta [No Edema] : there was no peripheral edema [No Extremity Clubbing/Cyanosis] : no extremity clubbing/cyanosis [Soft] : abdomen soft [Non Tender] : non-tender [Non-distended] : non-distended [No Masses] : no abdominal mass palpated [No HSM] : no HSM [Normal Bowel Sounds] : normal bowel sounds [Normal Anterior Cervical Nodes] : no anterior cervical lymphadenopathy [Normal Posterior Cervical Nodes] : no posterior cervical lymphadenopathy [No CVA Tenderness] : no CVA  tenderness [No Spinal Tenderness] : no spinal tenderness [No Joint Swelling] : no joint swelling [Grossly Normal Strength/Tone] : grossly normal strength/tone [No Rash] : no rash [Coordination Grossly Intact] : coordination grossly intact [No Focal Deficits] : no focal deficits [Normal Gait] : normal gait [Deep Tendon Reflexes (DTR)] : deep tendon reflexes were 2+ and symmetric [Normal Affect] : the affect was normal [Normal Insight/Judgement] : insight and judgment were intact

## 2024-03-05 ENCOUNTER — APPOINTMENT (OUTPATIENT)
Dept: INTERNAL MEDICINE | Facility: CLINIC | Age: 54
End: 2024-03-05
Payer: COMMERCIAL

## 2024-03-05 PROCEDURE — 96372 THER/PROPH/DIAG INJ SC/IM: CPT

## 2024-03-05 RX ADMIN — CYANOCOBALAMIN 0 MCG/ML: 1000 INJECTION INTRAMUSCULAR; SUBCUTANEOUS at 00:00

## 2024-03-06 RX ORDER — CYANOCOBALAMIN 1000 UG/ML
1000 INJECTION INTRAMUSCULAR; SUBCUTANEOUS
Qty: 0 | Refills: 0 | Status: COMPLETED | OUTPATIENT
Start: 2024-03-05

## 2024-03-12 ENCOUNTER — APPOINTMENT (OUTPATIENT)
Dept: INTERNAL MEDICINE | Facility: CLINIC | Age: 54
End: 2024-03-12
Payer: COMMERCIAL

## 2024-03-12 PROCEDURE — 96372 THER/PROPH/DIAG INJ SC/IM: CPT

## 2024-03-12 RX ORDER — CYANOCOBALAMIN 1000 UG/ML
1000 INJECTION INTRAMUSCULAR; SUBCUTANEOUS
Qty: 0 | Refills: 0 | Status: COMPLETED | OUTPATIENT
Start: 2024-03-12

## 2024-03-28 ENCOUNTER — OUTPATIENT (OUTPATIENT)
Dept: OUTPATIENT SERVICES | Facility: HOSPITAL | Age: 54
LOS: 1 days | End: 2024-03-28
Payer: COMMERCIAL

## 2024-03-28 VITALS
DIASTOLIC BLOOD PRESSURE: 87 MMHG | WEIGHT: 315 LBS | RESPIRATION RATE: 16 BRPM | SYSTOLIC BLOOD PRESSURE: 134 MMHG | TEMPERATURE: 97 F | HEIGHT: 76 IN | HEART RATE: 76 BPM | OXYGEN SATURATION: 98 %

## 2024-03-28 DIAGNOSIS — Z01.818 ENCOUNTER FOR OTHER PREPROCEDURAL EXAMINATION: ICD-10-CM

## 2024-03-28 DIAGNOSIS — Z98.84 BARIATRIC SURGERY STATUS: Chronic | ICD-10-CM

## 2024-03-28 DIAGNOSIS — D50.9 IRON DEFICIENCY ANEMIA, UNSPECIFIED: ICD-10-CM

## 2024-03-28 DIAGNOSIS — K43.2 INCISIONAL HERNIA WITHOUT OBSTRUCTION OR GANGRENE: Chronic | ICD-10-CM

## 2024-03-28 DIAGNOSIS — Z98.890 OTHER SPECIFIED POSTPROCEDURAL STATES: Chronic | ICD-10-CM

## 2024-03-28 LAB
ANION GAP SERPL CALC-SCNC: 6 MMOL/L — SIGNIFICANT CHANGE UP (ref 5–17)
BUN SERPL-MCNC: 6 MG/DL — LOW (ref 7–23)
CALCIUM SERPL-MCNC: 8.5 MG/DL — SIGNIFICANT CHANGE UP (ref 8.5–10.1)
CHLORIDE SERPL-SCNC: 107 MMOL/L — SIGNIFICANT CHANGE UP (ref 96–108)
CO2 SERPL-SCNC: 26 MMOL/L — SIGNIFICANT CHANGE UP (ref 22–31)
CREAT SERPL-MCNC: 0.73 MG/DL — SIGNIFICANT CHANGE UP (ref 0.5–1.3)
EGFR: 107 ML/MIN/1.73M2 — SIGNIFICANT CHANGE UP
GLUCOSE SERPL-MCNC: 79 MG/DL — SIGNIFICANT CHANGE UP (ref 70–99)
HCT VFR BLD CALC: 34 % — LOW (ref 39–50)
HGB BLD-MCNC: 10.1 G/DL — LOW (ref 13–17)
MCHC RBC-ENTMCNC: 22.6 PG — LOW (ref 27–34)
MCHC RBC-ENTMCNC: 29.7 GM/DL — LOW (ref 32–36)
MCV RBC AUTO: 76.1 FL — LOW (ref 80–100)
NRBC # BLD: 0 /100 WBCS — SIGNIFICANT CHANGE UP (ref 0–0)
PLATELET # BLD AUTO: 264 K/UL — SIGNIFICANT CHANGE UP (ref 150–400)
POTASSIUM SERPL-MCNC: 4.4 MMOL/L — SIGNIFICANT CHANGE UP (ref 3.5–5.3)
POTASSIUM SERPL-SCNC: 4.4 MMOL/L — SIGNIFICANT CHANGE UP (ref 3.5–5.3)
RBC # BLD: 4.47 M/UL — SIGNIFICANT CHANGE UP (ref 4.2–5.8)
RBC # FLD: 16.1 % — HIGH (ref 10.3–14.5)
SODIUM SERPL-SCNC: 139 MMOL/L — SIGNIFICANT CHANGE UP (ref 135–145)
WBC # BLD: 7.34 K/UL — SIGNIFICANT CHANGE UP (ref 3.8–10.5)
WBC # FLD AUTO: 7.34 K/UL — SIGNIFICANT CHANGE UP (ref 3.8–10.5)

## 2024-03-28 PROCEDURE — 85027 COMPLETE CBC AUTOMATED: CPT

## 2024-03-28 PROCEDURE — G0463: CPT

## 2024-03-28 PROCEDURE — 36415 COLL VENOUS BLD VENIPUNCTURE: CPT

## 2024-03-28 PROCEDURE — 80048 BASIC METABOLIC PNL TOTAL CA: CPT

## 2024-03-28 NOTE — H&P PST ADULT - MUSCULOSKELETAL
Phlegmonous cellulitis negative normal/ROM intact/normal gait/strength 5/5 bilateral upper extremities/strength 5/5 bilateral lower extremities

## 2024-03-28 NOTE — H&P PST ADULT - HISTORY OF PRESENT ILLNESS
52 y/o male with PMH of CVA (2/6/2024), HLD, ETOH abuse (completed Rehab 10/2018 & sober), COPD, Iron Deficiency Anemia, Venous Insufficiency Tinnitus, and [place] [employer] & [place] Gastric Bypass (2006), Incisional hernia (2007), and Endoscopy for PST having Upper Endoscopy/ Colonoscopy by Dr. Terrell on 4/4/2024.      Of Note:  Recent Dx Infarct Left Corona Radiata by imaging, was seen by Neurology 2/27/2024 and was recommended to have cardiac workup.

## 2024-03-28 NOTE — H&P PST ADULT - NSICDXFAMILYHX_GEN_ALL_CORE_FT
FAMILY HISTORY:  Father  Still living? Unknown  FH: hypertension, Age at diagnosis: Age Unknown  FH: polycythemia vera, Age at diagnosis: Age Unknown    Mother  Still living? Unknown  Family history of thyroid disease, Age at diagnosis: Age Unknown    Sibling  Still living? Unknown  Family history of thyroid disease, Age at diagnosis: Age Unknown    Grandparent  Still living? Unknown  FH: pancreatic cancer, Age at diagnosis: Age Unknown  FH: myocardial infarction, Age at diagnosis: Age Unknown

## 2024-03-28 NOTE — H&P PST ADULT - NSANTHOSAYNRD_GEN_A_CORE
No. KATIUSKA screening performed.  STOP BANG Legend: 0-2 = LOW Risk; 3-4 = INTERMEDIATE Risk; 5-8 = HIGH Risk

## 2024-03-28 NOTE — H&P PST ADULT - PROBLEM SELECTOR PLAN 2
Labs CBC  EKG from 1/30/2024 on chart  Medical and Cardiac clearance necessary  Neuro note on chart  Pre op instructions reviewed and given.   No meds to take am of surgery.   Instructed to hold and/or avoid other NSAIDs and OTC supplements. Tylenol is ok. Verbalized understanding Labs CBC  EKG from 1/30/2024 on chart  Cardiac clearance necessary  Neuro note on chart  Pre op instructions reviewed and given.   No meds to take am of surgery.   Instructed to hold and/or avoid other NSAIDs and OTC supplements. Tylenol is ok. Verbalized understanding

## 2024-03-28 NOTE — H&P PST ADULT - NSICDXPROCEDURE_GEN_ALL_CORE_FT
PROCEDURES:  EGD with biopsy 28-Mar-2024 09:42:41 Transoral Avril Neff  Diagnostic flexible colonoscopy 28-Mar-2024 09:43:01 w/ Collj Spec when Pfrmd Avril Neff

## 2024-03-28 NOTE — H&P PST ADULT - HEMATOLOGY/LYMPHATICS
54yo M with remote history of + covid with RUE tremor x 3 days in UE and LE , recovered on 4/23 and has been doing well since. He reports that over the last 3 nights he has had poor sleep. improved with B1 at home Po   added B1 and B12 level , NS  with additive , MR head , improved with benzo in ER ,  F.U neuro Dr galvin called by primary team, seizure precautions
negative

## 2024-04-04 ENCOUNTER — APPOINTMENT (OUTPATIENT)
Dept: GASTROENTEROLOGY | Facility: HOSPITAL | Age: 54
End: 2024-04-04

## 2024-04-08 ENCOUNTER — APPOINTMENT (OUTPATIENT)
Dept: CARDIOLOGY | Facility: CLINIC | Age: 54
End: 2024-04-08
Payer: COMMERCIAL

## 2024-04-08 VITALS
HEART RATE: 79 BPM | OXYGEN SATURATION: 100 % | HEIGHT: 76 IN | SYSTOLIC BLOOD PRESSURE: 166 MMHG | DIASTOLIC BLOOD PRESSURE: 93 MMHG

## 2024-04-08 DIAGNOSIS — R03.0 ELEVATED BLOOD-PRESSURE READING, W/OUT DIAGNOSIS OF HYPERTENSION: ICD-10-CM

## 2024-04-08 PROBLEM — Z86.69 PERSONAL HISTORY OF OTHER DISEASES OF THE NERVOUS SYSTEM AND SENSE ORGANS: Chronic | Status: ACTIVE | Noted: 2024-03-28

## 2024-04-08 PROBLEM — E78.5 HYPERLIPIDEMIA, UNSPECIFIED: Chronic | Status: ACTIVE | Noted: 2024-03-28

## 2024-04-08 PROBLEM — I63.9 CEREBRAL INFARCTION, UNSPECIFIED: Chronic | Status: ACTIVE | Noted: 2024-03-28

## 2024-04-08 PROBLEM — J44.1 CHRONIC OBSTRUCTIVE PULMONARY DISEASE WITH (ACUTE) EXACERBATION: Chronic | Status: ACTIVE | Noted: 2024-03-28

## 2024-04-08 PROBLEM — D50.9 IRON DEFICIENCY ANEMIA, UNSPECIFIED: Chronic | Status: ACTIVE | Noted: 2024-03-28

## 2024-04-08 PROBLEM — I87.2 VENOUS INSUFFICIENCY (CHRONIC) (PERIPHERAL): Chronic | Status: ACTIVE | Noted: 2024-03-28

## 2024-04-08 PROBLEM — H91.90 UNSPECIFIED HEARING LOSS, UNSPECIFIED EAR: Chronic | Status: ACTIVE | Noted: 2024-03-28

## 2024-04-08 PROCEDURE — 93000 ELECTROCARDIOGRAM COMPLETE: CPT

## 2024-04-08 PROCEDURE — 99215 OFFICE O/P EST HI 40 MIN: CPT | Mod: 25

## 2024-04-08 PROCEDURE — G2211 COMPLEX E/M VISIT ADD ON: CPT | Mod: NC,1L

## 2024-04-08 RX ORDER — ASPIRIN ENTERIC COATED TABLETS 81 MG 81 MG/1
81 TABLET, DELAYED RELEASE ORAL
Qty: 90 | Refills: 3 | Status: ACTIVE | COMMUNITY
Start: 2024-04-08 | End: 1900-01-01

## 2024-04-08 NOTE — CARDIOLOGY SUMMARY
[de-identified] : 4/8/24 -sinus rhythm at a rate of 73 bpm.  Nonspecific diminished voltage in leads V5-V6.

## 2024-04-08 NOTE — PHYSICAL EXAM
[General Appearance - In No Acute Distress] : no acute distress [Normal Conjunctiva] : the conjunctiva exhibited no abnormalities [No Oral Pallor] : no oral pallor [Not Palpable] : not palpable [No Precordial Heave] : no precordial heave was noted [Normal Rate] : normal [Rhythm Regular] : regular [Normal S1] : normal S1 [Normal S2] : normal S2 [No Gallop] : no gallop heard [Distant] : the heart sounds were distant [No Murmur] : no murmurs heard [2+] : left 2+ [No Abnormalities] : the abdominal aorta was not enlarged and no bruit was heard [No Pitting Edema] : no pitting edema present [] : no respiratory distress [Respiration, Rhythm And Depth] : normal respiratory rhythm and effort [Auscultation Breath Sounds / Voice Sounds] : lungs were clear to auscultation bilaterally [Bowel Sounds] : normal bowel sounds [Abdomen Tenderness] : non-tender [Abnormal Walk] : normal gait [Oriented To Time, Place, And Person] : oriented to person, place, and time [Apical Thrill] : no thrill palpable at the apex [Click] : no click [Pericardial Rub] : no pericardial rub [Right Carotid Bruit] : no bruit heard over the right carotid [Left Carotid Bruit] : no bruit heard over the left carotid [FreeTextEntry1] : Chronic dermatitis changes right pretibial region

## 2024-04-08 NOTE — HISTORY OF PRESENT ILLNESS
[FreeTextEntry1] : Mr. Mayank Vincent presented to the office today for follow-up cardiac evaluation.  I previously evaluated the patient on 7/22/2020 in initial cardiology consultation. He is referred here at that time by Dr. Montoya for further evaluation of dyspnea on exertion, as well as to be evaluated for the possibility of underlying coronary disease, in view of the patient having erectile dysfunction.  Follow-up echocardiography and exercise stress testing were unrevealing for a cardiac etiology of the patient's symptom of dyspnea.  The patient is a 53-year-old male with a history a CVA (probably occurring in January 2024, without residual neurological deficits at this point), COPD (longtime smoker), lower extremity venous insufficiency, erectile dysfunction, hypotestosteronism, seasonal allergies, pulmonary nodules, iron deficiency anemia, vitamin B12 deficiency, gastric bypass surgery (2006), cholecystectomy, and incisional hernia repair.  In January 2024, the patient stood up from having been working at his desk at home for several hours, and noted feeling lightheaded.  He then noted generalized weakness, however, seemingly more so involving the right side of his body.  He consulted with his PCP, Dr. Montoya, a few days later, and was referred for an MRI scan of the brain and consultation with a neurologist.  The MRI scan of the brain was performed on 2/6/2024, interpreted as revealing a small chronic infarct involving the right corona radiata and a small subacute infarct involving the posterior left corona radiata.  The patient consulted with a neurologist, Dr. Tyson Lindsay, on 2/27/2024, who advised the patient to follow-up with his cardiologist, prompting his visit here today.  The patient does not have any subjective residual neurological deficits at this point, stating that his right-sided symptoms resolved within a few days of onset, and have not since recurred.  He has been stable from a cardiac symptomatic standpoint since his previous visit here on 7/22/2020.  Specifically, he continues to describe a similar degree of dyspnea on exertion in association with walking up an incline, attributed to obesity and decreased conditioning, without any recent change in the symptom pattern. He has not experienced any symptoms of chest discomfort in association with his activities. He has not noted orthopnea or paroxysmal nocturnal dyspnea. He reports having exhibited chronic lower extremity swelling secondary to venous insufficiency, however, he has not recently noted an issue with lower extremity swelling, pointing out that he has been wearing compression stockings. He has not experienced any episodes of palpitations, presyncope or syncope.  The patient consulted with a gastroenterologist, Dr. Terrell, regarding iron deficiency anemia.  The gastroenterologist was of the opinion that his iron deficiency anemia is in all likelihood related to his prior history of gastric bypass surgery, however, EGD and colonoscopy were recommended for further evaluation.  The patient consulted with a pulmonologist on 7/13/20 regarding his longtime cigarette smoking history, as well as to be evaluated for obstructive sleep apnea, in view of his obesity. A chest CT scan performed on 7/17/20 revealed small pulmonary nodules, without an appreciable change in appearance when compared with a previous study performed on 6/19/15. Pulmonary function studies performed on 7/20/20 were interpreted as being compatible with mild obstruction, however lung volumes and the DLCO were normal. The patient completed a home sleep study this morning, the results of which are pending at this time.  As far as risk factors for coronary artery disease are concerned, the patient has not unfavorably low HDL level.  He does not have a history of hypertension or diabetes. He had been smoking three quarters of a pack per day of cigarettes for approximately 30 years, stating that he discontinued cigarette smoking in March 2024. He does not have a known immediate family history of premature coronary artery disease.  Laboratory studies performed on 1/30/2024 revealed cholesterol 129, triglycerides 117, HDL 26, and calculated LDL 81.  The alkaline phosphatase level was mildly elevated at 130.  The remainder of the liver chemistries were normal.  The BUN and creatinine were 10 and 0.68, respectively.  The potassium level was 4.3.  The glucose level was 87 and the hemoglobin A1c level was 5.5%.  The hemoglobin and hematocrit were 9.9 and 33.0, with hypochromic microcytic indices.  Echocardiography performed on 8/18/2020 revealed normal cardiac chamber sizes with normal left ventricular wall thickness and wall motion.  Left ventricular systolic function was normal, with a calculated ejection fraction of 58%.  Impaired diastolic relaxation of the left ventricle was demonstrated.  The aortic root was noted to be minimally dilated, measuring 3.9 cm at the level of the sinuses of Valsalva.  Mild pulmonic regurgitation was demonstrated, without evidence of pulmonary hypertension.  Exercise stress testing performed on 9/1/2020 revealed the patient to exhibit reduced exercise tolerance for his age.  The study was negative for the inducement of cardiac symptoms, electrocardiographic evidence of myocardial ischemia, or significant arrhythmias.

## 2024-04-08 NOTE — DISCUSSION/SUMMARY
[EKG obtained to assist in diagnosis and management of assessed problem(s)] : EKG obtained to assist in diagnosis and management of assessed problem(s) [FreeTextEntry1] : Mr. Vincent appears to have suffered a CVA in January 2024, manifesting with vaguely-described right-sided symptoms, which persisted for a few days, before resolving, and having not since recurred.  A follow-up MRI scan of the brain performed on 2/7/2024 revealed evidence for a small chronic infarct involving the right corona radiata and a small subacute infarct involving the posterior left corona radiata.  He has been stable from a cardiac symptomatic standpoint since his previous visit here on 7/22/2020.  Specifically, he has noted a similar degree of dyspnea on exertion in association with walking up an incline, attributed to obesity and decreased cardiovascular conditioning.  His cardiac examination today is remarkable for distant heart sounds, in all likelihood related to his body habitus.  He has chronic dermatitis changes involving the right pretibial region, which he states is related to a previous injury.  He is not exhibiting any evidence of congestive heart failure on examination today.  His blood pressure reading today is mildly elevated.  His electrocardiogram today reveals sinus rhythm with nonspecific diminished voltage in leads V5-V6, essentially unchanged from his previous tracing in the EMR.  I have discussed the implications of the brain MRI findings of 2/7/2024 in detail with the patient today.  In view of the patient having suffered a CVA in January 2024, I have instructed him to begin taking aspirin 81 mg daily.  I did point out to him that his CBC should be followed and he should check for the development of melena, noting that the patient does have a history of iron deficiency anemia (in all likelihood related to previous gastric bypass surgery).  In addition, although his cholesterol and LDL level levels are not significantly elevated, in view of the CVA, I am also going to initiate statin therapy.  Specifically, I have electronically prescribed Crestor 5 mg daily to his pharmacy for him today, and I have asked him to call me if he should have any difficulty tolerating this medication.  A follow-up fasting lipid profile and chemistry screen will be planned in approximately 2 months for reassessment on the Crestor.  In view of the CVA in January 2024, I am referring the patient for carotid artery Doppler testing to assess for carotid atherosclerosis formation, as well as echocardiography to assess cardiac structural integrity, left ventricular function, valvular morphology and function, and the pulmonary artery systolic pressure.  A 1 week extended Holter monitor study will be performed to assess for the possibility of paroxysmal atrial fibrillation as a possible etiology of his CVA.  In view of his mildly elevated blood pressure reading today, I am referring the patient for a 24-hour ambulatory blood pressure monitoring study in an effort to determine if he would benefit from antihypertensive therapy.  The patient is going to make arrangements to have the studies performed through our office, and I will telephone him to discuss the findings, once the studies have been completed.  I have reviewed the findings of the blood test report of 1/30/2024 in detail with the patient today. I pointed out to him that he has been unfavorably low HDL level, which hopefully can be optimized through weight loss and exercise. In addition, I pointed out to him that he had an iron deficiency anemia, possibly related to his previous gastric bypass surgery. I have recommended to the patient that he follow up with his gastroenterologist regarding surveillance and management of this anemia.  The importance of smoking cessation, dietary modification, weight loss, and regular exercise was emphasized to the patient today.  The patient will be following up with his pulmonologist, Dr. Sebastian, regarding management of obstructive sleep apnea and COPD.  I have asked the patient to call me if he should have any questions or problems or change in these matters, and especially if he should experience any concerning symptoms.  I have otherwise asked him to return to the office for follow-up cardiac evaluation in 2 months, provided he remains clinically stable in the interim, and the findings on the upcoming cardiovascular studies do not warrant sooner evaluation and/or treatment.

## 2024-04-12 ENCOUNTER — APPOINTMENT (OUTPATIENT)
Dept: INTERNAL MEDICINE | Facility: CLINIC | Age: 54
End: 2024-04-12

## 2024-04-15 ENCOUNTER — MED ADMIN CHARGE (OUTPATIENT)
Age: 54
End: 2024-04-15

## 2024-04-15 ENCOUNTER — APPOINTMENT (OUTPATIENT)
Dept: CARDIOLOGY | Facility: CLINIC | Age: 54
End: 2024-04-15
Payer: COMMERCIAL

## 2024-04-15 PROCEDURE — 93306 TTE W/DOPPLER COMPLETE: CPT

## 2024-04-15 PROCEDURE — 93790 AMBL BP MNTR W/SW I&R: CPT

## 2024-04-15 PROCEDURE — 93880 EXTRACRANIAL BILAT STUDY: CPT

## 2024-04-15 RX ORDER — PERFLUTREN 6.52 MG/ML
6.52 INJECTION, SUSPENSION INTRAVENOUS
Qty: 1 | Refills: 0 | Status: COMPLETED | OUTPATIENT
Start: 2024-04-15

## 2024-04-15 RX ADMIN — PERFLUTREN MG/ML: 6.52 INJECTION, SUSPENSION INTRAVENOUS at 00:00

## 2024-04-19 ENCOUNTER — APPOINTMENT (OUTPATIENT)
Dept: INTERNAL MEDICINE | Facility: CLINIC | Age: 54
End: 2024-04-19
Payer: COMMERCIAL

## 2024-04-19 PROCEDURE — 96372 THER/PROPH/DIAG INJ SC/IM: CPT

## 2024-04-19 RX ORDER — ROSUVASTATIN CALCIUM 5 MG/1
5 TABLET, FILM COATED ORAL
Qty: 90 | Refills: 3 | Status: ACTIVE | COMMUNITY
Start: 2024-04-08 | End: 1900-01-01

## 2024-06-03 ENCOUNTER — APPOINTMENT (OUTPATIENT)
Dept: INTERNAL MEDICINE | Facility: CLINIC | Age: 54
End: 2024-06-03
Payer: COMMERCIAL

## 2024-06-03 PROCEDURE — 96372 THER/PROPH/DIAG INJ SC/IM: CPT

## 2024-06-03 RX ADMIN — CYANOCOBALAMIN 0 MCG/ML: 1000 INJECTION INTRAMUSCULAR; SUBCUTANEOUS at 00:00

## 2024-06-05 RX ORDER — CYANOCOBALAMIN 1000 UG/ML
1000 INJECTION INTRAMUSCULAR; SUBCUTANEOUS
Qty: 0 | Refills: 0 | Status: COMPLETED | OUTPATIENT
Start: 2024-06-03

## 2024-06-11 ENCOUNTER — APPOINTMENT (OUTPATIENT)
Dept: CARDIOLOGY | Facility: CLINIC | Age: 54
End: 2024-06-11
Payer: COMMERCIAL

## 2024-06-11 VITALS
SYSTOLIC BLOOD PRESSURE: 150 MMHG | HEIGHT: 76 IN | DIASTOLIC BLOOD PRESSURE: 90 MMHG | OXYGEN SATURATION: 98 % | HEART RATE: 68 BPM

## 2024-06-11 DIAGNOSIS — R03.0 ELEVATED BLOOD-PRESSURE READING, W/OUT DIAGNOSIS OF HYPERTENSION: ICD-10-CM

## 2024-06-11 DIAGNOSIS — R06.09 OTHER FORMS OF DYSPNEA: ICD-10-CM

## 2024-06-11 DIAGNOSIS — I63.9 CEREBRAL INFARCTION, UNSPECIFIED: ICD-10-CM

## 2024-06-11 PROCEDURE — G2211 COMPLEX E/M VISIT ADD ON: CPT | Mod: NC

## 2024-06-11 PROCEDURE — 93000 ELECTROCARDIOGRAM COMPLETE: CPT

## 2024-06-11 PROCEDURE — 99215 OFFICE O/P EST HI 40 MIN: CPT | Mod: 25

## 2024-06-11 RX ORDER — SODIUM SULFATE, POTASSIUM SULFATE AND MAGNESIUM SULFATE 1.6; 3.13; 17.5 G/177ML; G/177ML; G/177ML
17.5-3.13-1.6 SOLUTION ORAL
Qty: 1 | Refills: 0 | Status: DISCONTINUED | COMMUNITY
Start: 2024-02-21 | End: 2024-06-11

## 2024-06-11 NOTE — CARDIOLOGY SUMMARY
[de-identified] : 6/11/24 -sinus rhythm at a rate of 70 bpm.  Nonspecific diminished voltage in leads V5-V6.

## 2024-06-11 NOTE — HISTORY OF PRESENT ILLNESS
[FreeTextEntry1] : Mr. Mayank Vincent presented to the office today for follow-up cardiac evaluation.  I evaluated the patient on 7/22/2020 in initial cardiology consultation. He was referred here at that time by Dr. Montoya for further evaluation of dyspnea on exertion, as well as to be evaluated for the possibility of underlying coronary disease, in view of the patient having erectile dysfunction.  Follow-up echocardiography and exercise stress testing were unrevealing for a cardiac etiology of the patient's symptom of dyspnea.  The patient is a 53-year-old male with a history of a CVA (probably occurring in January 2024, without residual neurological deficits at this point), COPD (longtime smoker), lower extremity venous insufficiency, erectile dysfunction, hypotestosteronism, seasonal allergies, pulmonary nodules, iron deficiency anemia, vitamin B12 deficiency, gastric bypass surgery (2006), cholecystectomy, and incisional hernia repair.  The patient has been stable from a cardiac symptomatic standpoint since his previous visit here on 4/8/2024.  Specifically, he continues to describe a similar degree of dyspnea on exertion in association with walking up an incline, attributed to obesity and decreased conditioning, without any recent change in the symptom pattern. He has not experienced any symptoms of chest discomfort in association with his activities. He has not noted orthopnea or paroxysmal nocturnal dyspnea. He reports having exhibited chronic lower extremity swelling secondary to venous insufficiency, however, he has not recently noted an issue with lower extremity swelling, pointing out that he has been wearing compression stockings. He has not experienced any episodes of palpitations, presyncope or syncope.  In January 2024, the patient stood up from having been working at his desk at home for several hours, and noted feeling lightheaded.  He then noted generalized weakness, however, seemingly more so involving the right side of his body.  He consulted with his PCP, Dr. Montoya, a few days later, and was referred for an MRI scan of the brain and consultation with a neurologist.  The MRI scan of the brain was performed on 2/6/2024, interpreted as revealing a small chronic infarct involving the right corona radiata and a small subacute infarct involving the posterior left corona radiata.  The patient consulted with a neurologist, Dr. Tyson Lindsay, on 2/27/2024.    The patient does not have any subjective residual neurological deficits at this point, stating that his right-sided symptoms resolved within a few days of onset, and have not since recurred.    The patient consulted with a gastroenterologist, Dr. Terrell, regarding iron deficiency anemia.  The gastroenterologist was of the opinion that his iron deficiency anemia is in all likelihood related to his prior history of gastric bypass surgery, however, EGD and colonoscopy were recommended for further evaluation.  The patient informing today that Dr. Terrell will be retiring in the near future, and he asked me for referral to another gastroenterologist to follow with after Dr. Terrell retires.  I referred him to Dr. Lin Bourgeois.  The patient consulted with a pulmonologist on 7/13/20 regarding his longtime cigarette smoking history, as well as to be evaluated for obstructive sleep apnea, in view of his obesity. A chest CT scan performed on 7/17/20 revealed small pulmonary nodules, without an appreciable change in appearance when compared with a previous study performed on 6/19/15. Pulmonary function studies performed on 7/20/20 were interpreted as being compatible with mild obstruction, however lung volumes and the DLCO were normal. The patient completed a home sleep study this morning, the results of which are pending at this time.  As far as risk factors for coronary artery disease are concerned, the patient has not unfavorably low HDL level.  He does not have a history of hypertension or diabetes. He had been smoking three quarters of a pack per day of cigarettes for approximately 30 years, stating that he discontinued cigarette smoking in March 2024. He does not have a known immediate family history of premature coronary artery disease.  Laboratory studies performed on 1/30/2024 revealed cholesterol 129, triglycerides 117, HDL 26, and calculated LDL 81.  The alkaline phosphatase level was mildly elevated at 130.  The remainder of the liver chemistries were normal.  The BUN and creatinine were 10 and 0.68, respectively.  The potassium level was 4.3.  The glucose level was 87 and the hemoglobin A1c level was 5.5%.  The hemoglobin and hematocrit were 9.9 and 33.0, with hypochromic microcytic indices.  A 24-hour ambulatory blood pressure monitoring study initiated on 4/15/2024 was limited to 16 readings (10 daytime, 6 nighttime), with the average reading being 130/80.  38% of systolic readings were in the elevated range and 13% of diastolic readings were in the elevated range.  A 1 week extended Holter monitor study initiated on 4/23/2024 revealed the predominant rhythm to be sinus rhythm at an average rate of 76 bpm, with a range of 49 to 138 bpm.  Rare supraventricular premature contractions were exhibited, including rare couplets.  No episodes of supraventricular tachycardia or paroxysmal atrial fibrillation were exhibited.  Rare ventricular premature contractions were exhibited, including rare couplets.  No episodes of ventricular tachycardia were exhibited.  No significant pauses were exhibited.  Echocardiography most recently performed on 4/15/2024 revealed normal cardiac chamber sizes with normal left ventricular wall thickness.  Left ventricular systolic function was normal, with a calculated ejection fraction of 62%.  Normal left ventricular diastolic function was demonstrated.  No significant valvular abnormalities were demonstrated.  There was no evidence of pulmonary hypertension.  Carotid artery Doppler testing performed on 4/15/2024 revealed mild plaque formation involving the bulbar regions and the internal carotid arteries bilaterally.  No significant stenoses were demonstrated.  Exercise stress testing performed on 9/1/2020 revealed the patient to exhibit reduced exercise tolerance for his age.  The study was negative for the inducement of cardiac symptoms, electrocardiographic evidence of myocardial ischemia, or significant arrhythmias.

## 2024-06-11 NOTE — DISCUSSION/SUMMARY
[EKG obtained to assist in diagnosis and management of assessed problem(s)] : EKG obtained to assist in diagnosis and management of assessed problem(s) [FreeTextEntry1] : Mr. Vincent appears to have suffered a CVA in January 2024, manifesting with vaguely-described right-sided symptoms, which persisted for a few days, before resolving, and having not since recurred.  A follow-up MRI scan of the brain performed on 2/7/2024 revealed evidence for a small chronic infarct involving the right corona radiata and a small subacute infarct involving the posterior left corona radiata.  He does not have any subjective residual neurological deficits, and he has not experienced recurrence of any recognized or suspected neurological signs or symptoms since the CVA of January 2024.   The patient has been stable from a cardiac symptomatic standpoint since his previous visit here on 4/8/2024.  Specifically, he has noted a similar degree of dyspnea on exertion in association with walking up an incline, which he attributes to obesity and decreased cardiovascular conditioning.  His cardiac examination today is remarkable for distant heart sounds, in all likelihood related to his body habitus.  He has chronic dermatitis changes involving the right pretibial region, which he states is related to a previous injury.  He is not exhibiting any evidence of congestive heart failure on examination today.  His blood pressure reading today is mildly elevated.  His electrocardiogram today reveals sinus rhythm with nonspecific diminished voltage in leads V5-V6, essentially unchanged from his previous office tracing.  I have reviewed the findings of the 24-hour ambulatory blood pressure monitoring study of 4/15/2024 in detail with the patient today.  Although his blood pressure reading in the office today is mildly elevated, as the average blood pressure reading on the ambulatory study was satisfactory, I have not recommended antihypertensive therapy at this time.  His blood pressure will be followed.  I have discussed the implications of the brain MRI findings of 2/7/2024 in detail with the patient today.  In view of the patient having suffered a CVA in January 2024, I had instructed him to begin taking aspirin 81 mg daily at the time of a follow-up visit with me on 4/8/2024.  I did point out to him that his CBC should be followed and he should check for the development of melena, noting that the patient does have a history of iron deficiency anemia (in all likelihood related to previous gastric bypass surgery).  In addition, although his cholesterol and LDL level levels are not significantly elevated, in view of the CVA, I initiated statin therapy (Crestor 5 mg daily) at the time of a follow-up visit with me on 4/8/2024.  I have reviewed the findings of the 24-hour ambulatory blood pressure monitoring study of 4/15/2024, the 1 week extended Holter monitor study of 4/23/2024, the echocardiogram of 4/15/2024, and the carotid artery Doppler study of 4/15/2024 in detail with the patient today.  For further evaluation of the patient's symptom of dyspnea on exertion, I am referring him for an exercise stress test to evaluate for the presence of exercised-induced myocardial ischemia, as well as to evaluate the heart rate and blood pressure response to exercise.  The patient is going to make arrangements to have this study performed through our office, and I will telephone him to discuss the findings, once the study has been completed.  I am referring the patient for a follow-up fasting lipid profile and chemistry screen to evaluate the efficacy of Crestor 5 mg daily in optimizing the lipid profile, as well as to check for any potential adverse effects on the liver.  In addition, a CBC will be performed in view of the patient having a history of iron deficiency anemia.  The importance of smoking cessation, dietary modification, weight loss, and regular exercise was again emphasized to the patient today.  The patient will be following up with his pulmonologist, Dr. Sebastian, regarding management of obstructive sleep apnea and COPD.  I have asked the patient to call me if he should have any questions or problems or change in these matters, and especially if he should experience any concerning symptoms.  I have otherwise asked him to return to the office for follow-up cardiac evaluation and blood pressure reassessment in 6 months, provided he remains clinically stable in the interim, and the findings on the upcoming exercise stress study do not warrant sooner evaluation and/or treatment.

## 2024-06-18 ENCOUNTER — APPOINTMENT (OUTPATIENT)
Dept: CARDIOLOGY | Facility: CLINIC | Age: 54
End: 2024-06-18
Payer: COMMERCIAL

## 2024-06-18 PROCEDURE — 93015 CV STRESS TEST SUPVJ I&R: CPT

## 2024-07-12 ENCOUNTER — APPOINTMENT (OUTPATIENT)
Dept: INTERNAL MEDICINE | Facility: CLINIC | Age: 54
End: 2024-07-12
Payer: COMMERCIAL

## 2024-07-12 PROCEDURE — 96372 THER/PROPH/DIAG INJ SC/IM: CPT

## 2024-07-12 RX ORDER — CYANOCOBALAMIN 1000 UG/ML
1000 INJECTION INTRAMUSCULAR; SUBCUTANEOUS
Qty: 0 | Refills: 0 | Status: COMPLETED | OUTPATIENT
Start: 2024-07-12

## 2024-08-09 ENCOUNTER — APPOINTMENT (OUTPATIENT)
Dept: INTERNAL MEDICINE | Facility: CLINIC | Age: 54
End: 2024-08-09

## 2024-08-09 ENCOUNTER — MED ADMIN CHARGE (OUTPATIENT)
Age: 54
End: 2024-08-09

## 2024-09-09 ENCOUNTER — APPOINTMENT (OUTPATIENT)
Dept: INTERNAL MEDICINE | Facility: CLINIC | Age: 54
End: 2024-09-09

## 2025-03-24 ENCOUNTER — NON-APPOINTMENT (OUTPATIENT)
Age: 55
End: 2025-03-24

## 2025-06-19 ENCOUNTER — RX RENEWAL (OUTPATIENT)
Age: 55
End: 2025-06-19

## 2025-06-20 ENCOUNTER — NON-APPOINTMENT (OUTPATIENT)
Age: 55
End: 2025-06-20

## 2025-09-13 ENCOUNTER — RX RENEWAL (OUTPATIENT)
Age: 55
End: 2025-09-13